# Patient Record
Sex: MALE | Race: BLACK OR AFRICAN AMERICAN | Employment: FULL TIME | URBAN - METROPOLITAN AREA
[De-identification: names, ages, dates, MRNs, and addresses within clinical notes are randomized per-mention and may not be internally consistent; named-entity substitution may affect disease eponyms.]

---

## 2017-08-04 ENCOUNTER — OFFICE VISIT (OUTPATIENT)
Dept: OPTOMETRY | Facility: CLINIC | Age: 22
End: 2017-08-04
Payer: OTHER GOVERNMENT

## 2017-08-04 DIAGNOSIS — Z01.00 EXAMINATION OF EYES AND VISION: Primary | ICD-10-CM

## 2017-08-04 DIAGNOSIS — H52.7 REFRACTIVE ERROR: ICD-10-CM

## 2017-08-04 PROCEDURE — 92014 COMPRE OPH EXAM EST PT 1/>: CPT | Mod: S$PBB,,, | Performed by: OPTOMETRIST

## 2017-08-04 PROCEDURE — 99999 PR PBB SHADOW E&M-EST. PATIENT-LVL I: CPT | Mod: PBBFAC,,, | Performed by: OPTOMETRIST

## 2017-08-04 PROCEDURE — 99211 OFF/OP EST MAY X REQ PHY/QHP: CPT | Mod: PBBFAC,PO | Performed by: OPTOMETRIST

## 2017-08-04 PROCEDURE — 92015 DETERMINE REFRACTIVE STATE: CPT | Mod: ,,, | Performed by: OPTOMETRIST

## 2017-08-04 NOTE — PROGRESS NOTES
HPI     Presenting Complaint: Pt here today for yearly eye exam.  DLE: 2 years  Pt states distance vision has bene stable, near and far. Pt currently does   not wear glasses. Pt has never wore contact before.     Pt is a , gets headaches after prolonged near work.     (-) headaches  (-) diplopia   (-) flashes / (-) floaters      Last edited by Jay Chau, OD on 8/4/2017 10:04 AM. (History)            Assessment /Plan     For exam results, see Encounter Report.    Examination of eyes and vision    Refractive error      Good ocular health OU. Mild accommodative asthenopia. Dispensed updated spectacle Rx for near vision. Discussed various spectacle lens options. Discussed adaptation period to new specs.       RTC in 1 year for comprehensive eye exam, or sooner prn.